# Patient Record
Sex: FEMALE | Race: WHITE
[De-identification: names, ages, dates, MRNs, and addresses within clinical notes are randomized per-mention and may not be internally consistent; named-entity substitution may affect disease eponyms.]

---

## 2017-01-13 ENCOUNTER — HOSPITAL ENCOUNTER (OUTPATIENT)
Dept: HOSPITAL 45 - C.LABMFLN | Age: 82
Discharge: HOME | End: 2017-01-13
Attending: FAMILY MEDICINE
Payer: COMMERCIAL

## 2017-01-13 DIAGNOSIS — E11.9: Primary | ICD-10-CM

## 2017-01-13 LAB — EST. AVERAGE GLUCOSE BLD GHB EST-MCNC: 154 MG/DL

## 2017-04-28 ENCOUNTER — HOSPITAL ENCOUNTER (OUTPATIENT)
Dept: HOSPITAL 45 - C.LABMFLN | Age: 82
Discharge: HOME | End: 2017-04-28
Attending: FAMILY MEDICINE
Payer: COMMERCIAL

## 2017-04-28 DIAGNOSIS — E11.9: Primary | ICD-10-CM

## 2017-04-28 LAB
ANION GAP SERPL CALC-SCNC: 9 MMOL/L (ref 3–11)
BUN SERPL-MCNC: 30 MG/DL (ref 7–18)
BUN/CREAT SERPL: 22.9 (ref 10–20)
CALCIUM SERPL-MCNC: 9.6 MG/DL (ref 8.5–10.1)
CHLORIDE SERPL-SCNC: 109 MMOL/L (ref 98–107)
CO2 SERPL-SCNC: 26 MMOL/L (ref 21–32)
CREAT SERPL-MCNC: 1.3 MG/DL (ref 0.6–1.2)
EST. AVERAGE GLUCOSE BLD GHB EST-MCNC: 174 MG/DL
GLUCOSE SERPL-MCNC: 179 MG/DL (ref 70–99)
PHOSPHATE SERPL-MCNC: 2.8 MG/DL (ref 2.5–4.9)
POTASSIUM SERPL-SCNC: 4.2 MMOL/L (ref 3.5–5.1)
SODIUM SERPL-SCNC: 144 MMOL/L (ref 136–145)

## 2017-08-04 ENCOUNTER — HOSPITAL ENCOUNTER (OUTPATIENT)
Dept: HOSPITAL 45 - C.LABMFLN | Age: 82
Discharge: HOME | End: 2017-08-04
Attending: FAMILY MEDICINE
Payer: COMMERCIAL

## 2017-08-04 DIAGNOSIS — E11.9: Primary | ICD-10-CM

## 2017-08-04 LAB
ANION GAP SERPL CALC-SCNC: 6 MMOL/L (ref 3–11)
BUN SERPL-MCNC: 26 MG/DL (ref 7–18)
BUN/CREAT SERPL: 20 (ref 10–20)
CALCIUM SERPL-MCNC: 9.3 MG/DL (ref 8.5–10.1)
CHLORIDE SERPL-SCNC: 107 MMOL/L (ref 98–107)
CO2 SERPL-SCNC: 28 MMOL/L (ref 21–32)
CREAT SERPL-MCNC: 1.3 MG/DL (ref 0.6–1.2)
EST. AVERAGE GLUCOSE BLD GHB EST-MCNC: 151 MG/DL
GLUCOSE SERPL-MCNC: 121 MG/DL (ref 70–99)
PHOSPHATE SERPL-MCNC: 3.3 MG/DL (ref 2.5–4.9)
POTASSIUM SERPL-SCNC: 4.4 MMOL/L (ref 3.5–5.1)
SODIUM SERPL-SCNC: 141 MMOL/L (ref 136–145)

## 2017-11-10 ENCOUNTER — HOSPITAL ENCOUNTER (OUTPATIENT)
Dept: HOSPITAL 45 - C.LABMFLN | Age: 82
Discharge: HOME | End: 2017-11-10
Attending: FAMILY MEDICINE
Payer: COMMERCIAL

## 2017-11-10 DIAGNOSIS — E11.9: Primary | ICD-10-CM

## 2017-11-10 DIAGNOSIS — E03.9: ICD-10-CM

## 2017-11-10 LAB
ALBUMIN/GLOB SERPL: 1 {RATIO} (ref 0.9–2)
ALP SERPL-CCNC: 89 U/L (ref 45–117)
ALT SERPL-CCNC: 25 U/L (ref 12–78)
ANION GAP SERPL CALC-SCNC: 5 MMOL/L (ref 3–11)
AST SERPL-CCNC: 18 U/L (ref 15–37)
BASOPHILS # BLD: 0.03 K/UL (ref 0–0.2)
BASOPHILS NFR BLD: 0.7 %
BUN SERPL-MCNC: 21 MG/DL (ref 7–18)
BUN/CREAT SERPL: 16.9 (ref 10–20)
CALCIUM SERPL-MCNC: 8.9 MG/DL (ref 8.5–10.1)
CHLORIDE SERPL-SCNC: 106 MMOL/L (ref 98–107)
CHOLEST/HDLC SERPL: 3.3 {RATIO}
CO2 SERPL-SCNC: 29 MMOL/L (ref 21–32)
COMPLETE: YES
CREAT SERPL-MCNC: 1.23 MG/DL (ref 0.6–1.2)
EOSINOPHIL NFR BLD AUTO: 209 K/UL (ref 130–400)
EST. AVERAGE GLUCOSE BLD GHB EST-MCNC: 151 MG/DL
GLOBULIN SER-MCNC: 3.6 GM/DL (ref 2.5–4)
GLUCOSE SERPL-MCNC: 141 MG/DL (ref 70–99)
GLUCOSE UR QL: 60 MG/DL
HCT VFR BLD CALC: 39.3 % (ref 37–47)
IG%: 0.2 %
IMM GRANULOCYTES NFR BLD AUTO: 36.5 %
KETONES UR QL STRIP: 102 MG/DL
LYMPHOCYTES # BLD: 1.63 K/UL (ref 1.2–3.4)
MCH RBC QN AUTO: 30.1 PG (ref 25–34)
MCHC RBC AUTO-ENTMCNC: 32.8 G/DL (ref 32–36)
MCV RBC AUTO: 91.6 FL (ref 80–100)
MONOCYTES NFR BLD: 15.9 %
NEUTROPHILS # BLD AUTO: 3.6 %
NEUTROPHILS NFR BLD AUTO: 43.1 %
NITRITE UR QL STRIP: 188 MG/DL (ref 0–150)
PH UR: 200 MG/DL (ref 0–200)
PMV BLD AUTO: 10.1 FL (ref 7.4–10.4)
POTASSIUM SERPL-SCNC: 4.3 MMOL/L (ref 3.5–5.1)
RBC # BLD AUTO: 4.29 M/UL (ref 4.2–5.4)
SODIUM SERPL-SCNC: 140 MMOL/L (ref 136–145)
TSH SERPL-ACNC: 1.36 UIU/ML (ref 0.3–4.5)
VERY LOW DENSITY LIPOPROT CALC: 38 MG/DL
WBC # BLD AUTO: 4.46 K/UL (ref 4.8–10.8)

## 2017-12-05 ENCOUNTER — HOSPITAL ENCOUNTER (OUTPATIENT)
Dept: HOSPITAL 45 - C.MAMM | Age: 82
Discharge: HOME | End: 2017-12-05
Attending: FAMILY MEDICINE
Payer: COMMERCIAL

## 2017-12-05 DIAGNOSIS — Z12.31: Primary | ICD-10-CM

## 2017-12-06 NOTE — MAMMOGRAPHY REPORT
BILATERAL DIGITAL SCREENING MAMMOGRAM WITH CAD: 12/5/2017

CLINICAL HISTORY: Routine screening.  Patient has no complaints.  





TECHNIQUE: Bilateral CC, MLO and repeat cc views of the left breast were obtained.  Current study was
 also evaluated with a Computer Aided Detection (CAD) system.  



COMPARISON: Comparison is made to exams dated:  12/1/2016 mammogram, 6/1/2016 mammogram, 12/1/2015 ma
mmogram, 11/16/2015 mammogram, 11/12/2014 mammogram, and 11/11/2013 mammogram - [facility].   



BREAST COMPOSITION:  There are scattered areas of fibroglandular density in both breasts.  



FINDINGS: There are stable benign coarse calcifications in the left breast.  No suspicious mass, arch
itectural distortion or cluster of suspicious microcalcifications is seen.  



IMPRESSION:  ACR BI-RADS CATEGORY 1: NEGATIVE

There is no mammographic evidence of malignancy. A 1 year screening mammogram is recommended.  The pa
tient will receive written notification of the results.  





Approximately 10% of breast cancers are not detected with mammography. A negative mammographic report
 should not delay biopsy if a clinically suggestive mass is present.



Apurva Lewis M.D.          

ay/:12/5/2017 16:14:29  



Imaging Technologist: DAXA Bill, M, [facility]

letter sent: Normal 1/2  

BI-RADS Code: ACR BI-RADS Category 1: Negative

## 2018-02-09 ENCOUNTER — HOSPITAL ENCOUNTER (OUTPATIENT)
Dept: HOSPITAL 45 - C.LABMFLN | Age: 83
Discharge: HOME | End: 2018-02-09
Attending: FAMILY MEDICINE
Payer: COMMERCIAL

## 2018-02-09 DIAGNOSIS — E11.9: Primary | ICD-10-CM

## 2018-02-09 LAB
ALBUMIN SERPL-MCNC: 3.5 GM/DL (ref 3.4–5)
BUN SERPL-MCNC: 31 MG/DL (ref 7–18)
CALCIUM SERPL-MCNC: 9.2 MG/DL (ref 8.5–10.1)
CO2 SERPL-SCNC: 27 MMOL/L (ref 21–32)
CREAT SERPL-MCNC: 1.28 MG/DL (ref 0.6–1.2)
CREAT UR-MCNC: 82.5 MG/DL
GLUCOSE SERPL-MCNC: 130 MG/DL (ref 70–99)
HBA1C MFR BLD: 7.1 % (ref 4.5–5.6)
PHOSPHATE SERPL-MCNC: 3.2 MG/DL (ref 2.5–4.9)
POTASSIUM SERPL-SCNC: 4.3 MMOL/L (ref 3.5–5.1)
SODIUM SERPL-SCNC: 139 MMOL/L (ref 136–145)

## 2018-08-07 ENCOUNTER — HOSPITAL ENCOUNTER (OUTPATIENT)
Dept: HOSPITAL 45 - C.LABMFLN | Age: 83
Discharge: HOME | End: 2018-08-07
Attending: PHYSICIAN ASSISTANT
Payer: COMMERCIAL

## 2018-08-07 DIAGNOSIS — R93.8: Primary | ICD-10-CM

## 2018-08-07 LAB
ALBUMIN SERPL-MCNC: 3.4 GM/DL (ref 3.4–5)
ALP SERPL-CCNC: 69 U/L (ref 45–117)
ALT SERPL-CCNC: 17 U/L (ref 12–78)
AST SERPL-CCNC: 14 U/L (ref 15–37)
BASOPHILS # BLD: 0.02 K/UL (ref 0–0.2)
BASOPHILS NFR BLD: 0.3 %
BUN SERPL-MCNC: 29 MG/DL (ref 7–18)
CALCIUM SERPL-MCNC: 9 MG/DL (ref 8.5–10.1)
CO2 SERPL-SCNC: 25 MMOL/L (ref 21–32)
CREAT SERPL-MCNC: 1.25 MG/DL (ref 0.6–1.2)
EOS ABS #: 0.11 K/UL (ref 0–0.5)
EOSINOPHIL NFR BLD AUTO: 203 K/UL (ref 130–400)
GLUCOSE SERPL-MCNC: 117 MG/DL (ref 70–99)
HCT VFR BLD CALC: 40.2 % (ref 37–47)
HGB BLD-MCNC: 13.3 G/DL (ref 12–16)
IG#: 0.01 K/UL (ref 0–0.02)
IMM GRANULOCYTES NFR BLD AUTO: 43.2 %
INR PPP: 1 (ref 0.9–1.1)
LYMPHOCYTES # BLD: 2.91 K/UL (ref 1.2–3.4)
MCH RBC QN AUTO: 30 PG (ref 25–34)
MCHC RBC AUTO-ENTMCNC: 33.1 G/DL (ref 32–36)
MCV RBC AUTO: 90.7 FL (ref 80–100)
MONO ABS #: 0.75 K/UL (ref 0.11–0.59)
MONOCYTES NFR BLD: 11.1 %
NEUT ABS #: 2.93 K/UL (ref 1.4–6.5)
NEUTROPHILS # BLD AUTO: 1.6 %
NEUTROPHILS NFR BLD AUTO: 43.7 %
PMV BLD AUTO: 10.4 FL (ref 7.4–10.4)
POTASSIUM SERPL-SCNC: 4.2 MMOL/L (ref 3.5–5.1)
PROT SERPL-MCNC: 6.7 GM/DL (ref 6.4–8.2)
PTT PATIENT: 23.8 SECONDS (ref 21–31)
RED CELL DISTRIBUTION WIDTH CV: 13.8 % (ref 11.5–14.5)
RED CELL DISTRIBUTION WIDTH SD: 45.2 FL (ref 36.4–46.3)
SODIUM SERPL-SCNC: 141 MMOL/L (ref 136–145)
WBC # BLD AUTO: 6.73 K/UL (ref 4.8–10.8)

## 2018-08-08 ENCOUNTER — HOSPITAL ENCOUNTER (OUTPATIENT)
Dept: HOSPITAL 45 - C.ACU | Age: 83
Discharge: HOME | End: 2018-08-08
Attending: INTERNAL MEDICINE
Payer: COMMERCIAL

## 2018-08-08 VITALS
HEART RATE: 64 BPM | SYSTOLIC BLOOD PRESSURE: 117 MMHG | TEMPERATURE: 97.7 F | DIASTOLIC BLOOD PRESSURE: 62 MMHG | OXYGEN SATURATION: 95 %

## 2018-08-08 VITALS
HEART RATE: 71 BPM | TEMPERATURE: 98.24 F | DIASTOLIC BLOOD PRESSURE: 67 MMHG | OXYGEN SATURATION: 97 % | SYSTOLIC BLOOD PRESSURE: 128 MMHG

## 2018-08-08 VITALS
TEMPERATURE: 97.7 F | SYSTOLIC BLOOD PRESSURE: 132 MMHG | OXYGEN SATURATION: 94 % | HEART RATE: 70 BPM | DIASTOLIC BLOOD PRESSURE: 81 MMHG

## 2018-08-08 VITALS
HEART RATE: 65 BPM | DIASTOLIC BLOOD PRESSURE: 62 MMHG | OXYGEN SATURATION: 95 % | TEMPERATURE: 97.52 F | SYSTOLIC BLOOD PRESSURE: 117 MMHG

## 2018-08-08 VITALS
SYSTOLIC BLOOD PRESSURE: 115 MMHG | TEMPERATURE: 97.7 F | DIASTOLIC BLOOD PRESSURE: 57 MMHG | HEART RATE: 62 BPM | OXYGEN SATURATION: 97 %

## 2018-08-08 VITALS
OXYGEN SATURATION: 97 % | SYSTOLIC BLOOD PRESSURE: 123 MMHG | TEMPERATURE: 97.88 F | DIASTOLIC BLOOD PRESSURE: 58 MMHG | HEART RATE: 68 BPM

## 2018-08-08 VITALS
WEIGHT: 153.22 LBS | HEIGHT: 62.99 IN | WEIGHT: 153.22 LBS | HEIGHT: 62.99 IN | BODY MASS INDEX: 27.15 KG/M2 | BODY MASS INDEX: 27.15 KG/M2

## 2018-08-08 VITALS
HEART RATE: 63 BPM | SYSTOLIC BLOOD PRESSURE: 118 MMHG | DIASTOLIC BLOOD PRESSURE: 57 MMHG | TEMPERATURE: 97.52 F | OXYGEN SATURATION: 98 %

## 2018-08-08 DIAGNOSIS — J45.909: ICD-10-CM

## 2018-08-08 DIAGNOSIS — K21.9: ICD-10-CM

## 2018-08-08 DIAGNOSIS — Z88.2: ICD-10-CM

## 2018-08-08 DIAGNOSIS — E11.22: ICD-10-CM

## 2018-08-08 DIAGNOSIS — J47.9: Primary | ICD-10-CM

## 2018-08-08 DIAGNOSIS — E78.5: ICD-10-CM

## 2018-08-08 DIAGNOSIS — E03.9: ICD-10-CM

## 2018-08-08 DIAGNOSIS — Z79.82: ICD-10-CM

## 2018-08-08 DIAGNOSIS — Z79.84: ICD-10-CM

## 2018-08-08 DIAGNOSIS — I12.9: ICD-10-CM

## 2018-08-08 DIAGNOSIS — Z79.899: ICD-10-CM

## 2018-08-08 DIAGNOSIS — N18.3: ICD-10-CM

## 2018-08-08 NOTE — POST SEDATION ASSESSMENT
Post Sedation Assessment


General


Date of Sedation


Aug 8, 2018.


Vital Signs:





Vital Signs Past 12 Hours








  Date Time  Temp Pulse Resp B/P (MAP) Pulse Ox O2 Delivery O2 Flow Rate FiO2


 


8/8/18 08:50  67 14 137/63 97 Mask 4 


 


8/8/18 08:30  60 14 143/70 99 Mask 4 


 


8/8/18 06:52 36.5 70 18 132/81 (98) 94 Room Air  











Post Procedure Recovery Score


Activity:  (2) Moves 4 extremities *


Respiration:  (2) Deep breath/cough


Circulation:  (2) +/-20% PreAnes Value


Consciousness:  (1) Arouseable (by name)


Oxygen Saturation:  (1) O2 needed for >90%


Post Anesthesia Score:  8





Discharge Sedation


Level of Care:  Fast Track Phase II





Post Sedation Plan


On clinical assessment, the patient appears to have tolerated the sedation 

without complications. Patient is recovering as anticipated.





Patient will continue to be monitored by nursing and may be discharged when 

sedation discharge criteria are met per below protocol. 





Upon Completions of procedure and additional 15 minutes continue every 5 minute 

vital signs and the P.A.R. score; then discharge to a Phase I or Fast Track to 

Phase II per the following guidelines:





* Discharge Patient to appropriate Phase II area if PAR is 8 or greater or 

return to pre- procedure baseline.  The post - procedure orders will be as 

directed. 





* If PAR score is less than 8 or not return to pre-procedure baseline then 

patient will follow Phase I monitoring till PAR is reached for Phase II.  The 

Phase I may be done in procedure room or may call to secure a Phase I area.





* If naloxone or flumazenil are used for reversal, hold in Phase I for an 

additional 60 -120 minutes before discharge to Phase II.  Please call the 

Sedation Physician to re-evaluate and complete post-note for discharge to Phase 

II area. 





Do NOT discharge from procedure sedation or Phase 1 until post- sedation 

evaluation note is complete by procedure /sedation MD





Sedation Discharge Instructions to be given to the patient at discharge to home.

## 2018-08-08 NOTE — DISCHARGE INSTRUCTIONS
Discharge Instructions


Date of Service


Aug 8, 2018.





Admission


Reason for Admission:  Abnormal Chest Ct, Shortness Of Breath





Discharge


Discharge Diagnosis / Problem:  Chronic Mucopurulent infection





Discharge Goals


Goal(s):  Diagnostic testing





Activity Recommendations


Activity Limitations:  resume your previous activity


Lifting Limitations:  none


Exercise/Sports Limitations:  none


May Resume Sexual Activity:  when tolerated


Shower/Bathe:  no limitations


Driving or Machine Use:  resume 1 day after discharge





.





Instructions / Follow-Up


Instructions / Follow-Up


ACTIVITY RECOMMENDATIONS:





*  Rest today, resume normal activity tomorrow.


*  Do not drive today.








SPECIAL CARE INSTRUCTIONS:





* Call your physician if you experience any chest or shoulder pain,


   fever, coughing, spitting up blood (more than 2 teaspoons) or


   excessive shortness of breath.





* Remove dressing from IV site (where needle was placed into the vein) 


   after 2 hours.  Apply a warm, moist compress to site if irritation occurs.


   Call physician if site becomes red or painful to touch.








FOLLOW UP VISIT:





*  Keep any scheduled doctor appointments.





Current Hospital Diet


Patient's current hospital diet:Regular





Discharge Diet


Recommended Diet:  Regular Diet


Fluid Restriction:  None





Procedures


Procedures Performed:  


Bronchoscopy





Pending Studies


Studies pending at discharge:  no





Laboratory Results





Hemoglobin A1c








Test


  5/25/18


07:42 Range/Units


 


 


Estimated Average Glucose 157   mg/dl


 


Hemoglobin A1c 7.1 H 4.5-5.6  %











Medical Emergencies








.


Who to Call and When:





Medical Emergencies:  If at any time you feel your situation is an emergency, 

please call 911 immediately.





.





Non-Emergent Contact


Non-Emergency issues call your:  Pulmonologist


Call Non-Emergent contact if:  temperature is above 101





.


.








"Provider Documentation" section prepared by Mejia Siegel.








.

## 2018-08-08 NOTE — OPERATIVE REPORT
DATE OF OPERATION:  08/08/2018

 

PROCEDURE:  Fiberoptic bronchoscopy with bronchoalveolar lavage.

 

INDICATIONS:  Chronic bronchiectasis with mucoid impaction.

 

ANESTHESIA PREOPERATIVELY:  None.

 

ANESTHESIA DURING PROCEDURE:  IV fentanyl 50 mg, IV Versed 4 mg, 20 mL of 2%

Xylocaine spray above and below the cords, 4% viscous Xylocaine intranasally.

 

DESCRIPTION OF PROCEDURE:  Fiberoptic bronchoscope was inserted through right

naris with minimal difficulty and passed to the level of true vocal cords. 

The cords appear to approximate normally with phonation without evidence of

lesions or paralysis.  The area was anesthetized with 2% Xylocaine spray, and

the scope was then introduced in the trachea and right and left

tracheobronchial tree.  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

third and fourth tracheal rings.  The amanda was sharp.  The right mainstem

bronchus was found to be free of endobronchial lesions.  Right upper lobe,

the apical posterior/anterior segments, bronchus intermedius, right middle

lobe and medial lateral segments, all basal segments of right lower lobe were

free of endobronchial lesions with a large degree of mucoid impaction

involving the basal segments, right lower lobe.  This was lavaged until

clear, and the aspirate was sent for appropriate studies.  Left

tracheobronchial tree showed severe bronchiectatic changes involving the left

lower lobe basal segments with mucoid impaction, and this area was copiously

lavaged with normal saline after being sent for appropriate studies.  Severe

erosive and friable mucosa was seen.  Brushings were taken for left upper

lobe lingular orifice and left lower lobe orifice x3 with a minimal amount of

bleeding which abated spontaneously.  Biopsies were not attempted. 

Fluoroscopy was not utilized.  The procedure was tolerated, and patient was

given a nebulizer treatment of Xopenex 1.25 mg and transferred back to same

day surgery hemodynamically stable, no signs of respiratory compromise.  We

will await microbiological and cytologic examination of bronchial washings

and brushings.

 

 

I attest to the content of the Intraoperative Record and any orders documented therein. Any exception
s are noted below.

## 2018-08-08 NOTE — PRE SEDATION ASSESSMENT
Pre Sedation Assessment


General


Date of Sedation:


Aug 8, 2018.





Vital Signs Past 12 Hours








  Date Time  Temp Pulse Resp B/P (MAP) Pulse Ox O2 Delivery O2 Flow Rate FiO2


 


8/8/18 06:52 36.5 70 18 132/81 (98) 94 Room Air  











Pre-Sedation Airway Assessment


Smoking Status:  Never Smoker


Hx of Sleep Apnea:  No


Short Thick Neck:  No


Thyro-mental Distance:  > 3 Finger Breadths


Oral Cavity:  WNL


Mallampati Classification:  Class II


ASA Classification:  Class II





NPO Status


Date of Last Intake of Fluids:  Aug 7, 2018


Time of Last Intake of Fluids:  1800


Date of Last Intake of Solids:  Aug 7, 2018


Time of Last Intake of Solids:  1800





Procedure Planning


Contraindications for Sedation:  None


Current Medications Reviewed:  Yes





Notes








The planned sedation has been discussed with the patient. Informed Consent was 

obtained.  I have identified the patient, determined the appropriateness of 

sedation and have assessed the patient immediately prior to the procedure.  





All medicine(s) and interventions are by my order.

## 2018-08-08 NOTE — [facility] OPERATIVE REPORT
Operative Report


Operative Date


Aug 8, 2018.





Pre-Operative Diagnosis





Chronic Bronchiectasis w mucoid impaction





Post-Operative Diagnosis





Same





Procedure(s) Performed





Bronchoscopy





Surgeon


Dr. Siegel





Findings


Chronic Mucopurulent impaction





Specimens





Washings and brushings L tracheobronchial tree wash





Complication(s)


None





Disposition





I attest to the content of the Intraoperative Record and any orders documented 

therein.  Any exceptions are noted below.

## 2018-08-10 LAB — HSV SPEC CULT: (no result)
